# Patient Record
Sex: FEMALE | URBAN - METROPOLITAN AREA
[De-identification: names, ages, dates, MRNs, and addresses within clinical notes are randomized per-mention and may not be internally consistent; named-entity substitution may affect disease eponyms.]

---

## 2018-05-11 ENCOUNTER — OUTPATIENT (OUTPATIENT)
Dept: OUTPATIENT SERVICES | Facility: HOSPITAL | Age: 78
LOS: 1 days | End: 2018-05-11

## 2018-09-13 ENCOUNTER — TRANSCRIPTION ENCOUNTER (OUTPATIENT)
Age: 78
End: 2018-09-13

## 2021-03-29 ENCOUNTER — APPOINTMENT (OUTPATIENT)
Dept: CT IMAGING | Facility: CLINIC | Age: 81
End: 2021-03-29
Payer: MEDICARE

## 2021-03-29 PROCEDURE — Q9967B: CUSTOM

## 2021-03-29 PROCEDURE — 71275 CT ANGIOGRAPHY CHEST: CPT | Mod: MH

## 2021-03-29 PROCEDURE — 82565A: CUSTOM | Mod: QW

## 2021-07-26 PROBLEM — Z00.00 ENCOUNTER FOR PREVENTIVE HEALTH EXAMINATION: Status: ACTIVE | Noted: 2021-07-26

## 2021-08-05 ENCOUNTER — APPOINTMENT (OUTPATIENT)
Dept: NEUROSURGERY | Facility: CLINIC | Age: 81
End: 2021-08-05
Payer: MEDICARE

## 2021-08-05 VITALS
SYSTOLIC BLOOD PRESSURE: 145 MMHG | TEMPERATURE: 97.9 F | DIASTOLIC BLOOD PRESSURE: 90 MMHG | HEIGHT: 64.5 IN | BODY MASS INDEX: 22.43 KG/M2 | WEIGHT: 133 LBS | HEART RATE: 72 BPM

## 2021-08-05 DIAGNOSIS — M48.061 SPINAL STENOSIS, LUMBAR REGION WITHOUT NEUROGENIC CLAUDICATION: ICD-10-CM

## 2021-08-05 PROCEDURE — 99204 OFFICE O/P NEW MOD 45 MIN: CPT

## 2021-08-05 RX ORDER — LOSARTAN POTASSIUM 100 MG/1
TABLET, FILM COATED ORAL
Refills: 0 | Status: ACTIVE | COMMUNITY

## 2021-08-05 RX ORDER — HYDROCHLOROTHIAZIDE 12.5 MG/1
12.5 CAPSULE ORAL
Refills: 0 | Status: ACTIVE | COMMUNITY

## 2021-08-05 RX ORDER — DICYCLOMINE HYDROCHLORIDE 20 MG/1
20 TABLET ORAL
Refills: 0 | Status: ACTIVE | COMMUNITY

## 2021-08-05 RX ORDER — ALPRAZOLAM 0.25 MG/1
0.25 TABLET ORAL
Refills: 0 | Status: ACTIVE | COMMUNITY

## 2021-08-05 RX ORDER — SIMVASTATIN 80 MG/1
TABLET, FILM COATED ORAL
Refills: 0 | Status: ACTIVE | COMMUNITY

## 2021-08-05 RX ORDER — TRAZODONE HYDROCHLORIDE 300 MG/1
TABLET ORAL
Refills: 0 | Status: ACTIVE | COMMUNITY

## 2021-08-05 NOTE — REASON FOR VISIT
[New Patient Visit] : a new patient visit [Referred By: _________] : Patient was referred by SAROJ [FreeTextEntry1] : RT L4-5 Disc

## 2021-08-05 NOTE — PLAN
[FreeTextEntry1] : In summary this delightful 81-year-old female has chronic low back pain.  MRI significant for degenerative disc disease lumbar stenosis and Tarlov cysts.  This time the patient symptoms are quite manageable with conservative measures and she has not exhausted all options of conservative measures.  I would continue with therapies and if not improved to consider injections in the future.  We could see the patient back for follow-up in the future if all options of conservative measures fail patient understands and agrees with plan

## 2021-08-05 NOTE — HISTORY OF PRESENT ILLNESS
[FreeTextEntry1] : Low back pain [de-identified] : This is a 81-year-old female with right-sided low back pain which she has had for several years.  She has had no leg pain.  Denies any numbness tingling weakness or bladder bowel dysfunction.  She denies any balance issues.  She also has a secondary complaint of chronic neck pain and thoracic pain.  She states the pain on average daily is a 2 out of 10 on a visual analog scale.  The pain is intermediate.  She has tried steroid and anti-inflammatories without relief.  She has not had physical therapy but has been prescribed to start it.  She started some chiropractic care with some mild symptomatic relief.  She has not had pain management or injection therapy

## 2021-08-05 NOTE — RESULTS/DATA
[FreeTextEntry1] : Patient has an MRI done from [facility] which does show multilevel degenerative disc disease as well as stenosis at L3-S1.  She also has large Tarlov cyst seen over the sacrum

## 2022-05-25 ENCOUNTER — NON-APPOINTMENT (OUTPATIENT)
Age: 82
End: 2022-05-25

## 2022-11-19 ENCOUNTER — OFFICE (OUTPATIENT)
Dept: URBAN - METROPOLITAN AREA CLINIC 97 | Facility: CLINIC | Age: 82
Setting detail: OPHTHALMOLOGY
End: 2022-11-19
Payer: MEDICARE

## 2022-11-19 ENCOUNTER — RX ONLY (RX ONLY)
Age: 82
End: 2022-11-19

## 2022-11-19 DIAGNOSIS — H16.223: ICD-10-CM

## 2022-11-19 DIAGNOSIS — H35.3131: ICD-10-CM

## 2022-11-19 PROCEDURE — 92134 CPTRZ OPH DX IMG PST SGM RTA: CPT | Performed by: OPHTHALMOLOGY

## 2022-11-19 PROCEDURE — 92014 COMPRE OPH EXAM EST PT 1/>: CPT | Performed by: OPHTHALMOLOGY

## 2022-11-19 ASSESSMENT — REFRACTION_CURRENTRX
OD_SPHERE: -0.75
OS_AXIS: 180
OS_VPRISM_DIRECTION: PROGS
OD_AXIS: 015
OS_ADD: +2.75
OD_SPHERE: -0.75
OS_OVR_VA: 20/
OD_VPRISM_DIRECTION: PROGS
OS_CYLINDER: +0.25
OS_SPHERE: -1.25
OS_OVR_VA: 20/
OS_SPHERE: -2.00
OS_CYLINDER: +1.25
OD_OVR_VA: 20/
OD_CYLINDER: SPH
OD_OVR_VA: 20/
OD_AXIS: 015
OD_SPHERE: -0.75
OS_SPHERE: -1.50
OD_OVR_VA: 20/
OS_OVR_VA: 20/
OS_CYLINDER: +2.00
OD_CYLINDER: +1.75
OD_VPRISM_DIRECTION: PROGS
OD_CYLINDER: +2.25
OD_ADD: +2.75
OS_AXIS: 175
OD_ADD: +2.75
OS_AXIS: 050
OS_ADD: +2.75
OS_VPRISM_DIRECTION: PROGS

## 2022-11-19 ASSESSMENT — REFRACTION_MANIFEST
OS_SPHERE: -1.25
OS_VA2: 20/30(J2)
OU_VA: 20/25+2
OD_CYLINDER: +1.00
OS_ADD: +2.50
OD_SPHERE: +0.25
OS_SPHERE: -0.25
OD_VA2: 20/30(J2)
OD_CYLINDER: -1.00
OD_ADD: +2.50
OS_VA2: 20/25(J1)
OS_AXIS: 015
OD_VA2: 20/25(J1)
OS_VA1: 20/30+2
OS_CYLINDER: -1.00
OD_ADD: +3.00
OD_AXIS: 085
OS_CYLINDER: +1.00
OD_SPHERE: -1.00
OD_VA1: 20/40
OS_VA1: 20/40
OU_VA: 20/40
OS_AXIS: 105
OD_VA1: 20/25-2
OS_ADD: +3.00
OD_AXIS: 175

## 2022-11-19 ASSESSMENT — SPHEQUIV_DERIVED
OD_SPHEQUIV: -0.5
OS_SPHEQUIV: -1
OS_SPHEQUIV: -0.75
OD_SPHEQUIV: -0.25
OD_SPHEQUIV: -0.25
OS_SPHEQUIV: -0.75

## 2022-11-19 ASSESSMENT — REFRACTION_AUTOREFRACTION
OD_CYLINDER: -1.00
OD_SPHERE: +0.25
OS_CYLINDER: -1.00
OD_AXIS: 083
OS_SPHERE: -0.50
OS_AXIS: 105

## 2022-11-19 ASSESSMENT — DECREASING TEAR LAKE - SEVERITY SCORE
OD_DEC_TEARLAKE: 1+
OS_DEC_TEARLAKE: 1+

## 2022-11-19 ASSESSMENT — AXIALLENGTH_DERIVED
OS_AL: 23.701
OS_AL: 23.7997
OD_AL: 23.4605
OD_AL: 23.5572
OS_AL: 23.701
OD_AL: 23.4605

## 2022-11-19 ASSESSMENT — KERATOMETRY
OD_K1POWER_DIOPTERS: 43.75
OD_K2POWER_DIOPTERS: 44.50
OS_K1POWER_DIOPTERS: 43.50
OS_K2POWER_DIOPTERS: 44.50
METHOD_AUTO_MANUAL: AUTO
OD_AXISANGLE_DEGREES: 169
OS_AXISANGLE_DEGREES: 013

## 2022-11-19 ASSESSMENT — VISUAL ACUITY
OS_BCVA: 20/60-1
OD_BCVA: 20/50

## 2022-11-19 ASSESSMENT — TEAR BREAK UP TIME (TBUT)
OD_TBUT: 8 SEC
OS_TBUT: 8 SEC

## 2022-11-19 ASSESSMENT — CONFRONTATIONAL VISUAL FIELD TEST (CVF)
OS_FINDINGS: FULL
OD_FINDINGS: FULL

## 2022-11-19 ASSESSMENT — SUPERFICIAL PUNCTATE KERATITIS (SPK)
OD_SPK: T
OS_SPK: T

## 2023-06-21 ENCOUNTER — OFFICE (OUTPATIENT)
Dept: URBAN - METROPOLITAN AREA CLINIC 8 | Facility: CLINIC | Age: 83
Setting detail: OPHTHALMOLOGY
End: 2023-06-21
Payer: MEDICARE

## 2023-06-21 DIAGNOSIS — Z96.1: ICD-10-CM

## 2023-06-21 DIAGNOSIS — H35.3131: ICD-10-CM

## 2023-06-21 DIAGNOSIS — H16.223: ICD-10-CM

## 2023-06-21 PROCEDURE — 92134 CPTRZ OPH DX IMG PST SGM RTA: CPT | Performed by: OPHTHALMOLOGY

## 2023-06-21 PROCEDURE — 99213 OFFICE O/P EST LOW 20 MIN: CPT | Performed by: OPHTHALMOLOGY

## 2023-06-21 ASSESSMENT — REFRACTION_CURRENTRX
OD_CYLINDER: SPH
OS_CYLINDER: +0.25
OD_OVR_VA: 20/
OS_AXIS: 050
OD_OVR_VA: 20/
OD_AXIS: 088
OD_AXIS: 091
OS_AXIS: 106
OD_CYLINDER: -1.00
OS_VPRISM_DIRECTION: SV
OS_CYLINDER: -1.00
OS_SPHERE: +2.75
OS_CYLINDER: -1.00
OS_OVR_VA: 20/
OS_SPHERE: -1.50
OD_SPHERE: +3.00
OS_OVR_VA: 20/
OD_SPHERE: PLANO
OS_VPRISM_DIRECTION: SV
OD_VPRISM_DIRECTION: SV
OD_SPHERE: -0.75
OD_CYLINDER: -1.00
OS_SPHERE: -0.25
OD_OVR_VA: 20/
OS_OVR_VA: 20/
OS_AXIS: 102
OD_VPRISM_DIRECTION: SV

## 2023-06-21 ASSESSMENT — REFRACTION_MANIFEST
OD_ADD: +2.50
OS_CYLINDER: -1.00
OS_VA1: 20/40
OS_AXIS: 105
OS_ADD: +3.00
OD_AXIS: 175
OD_CYLINDER: +1.00
OS_VA2: 20/30(J2)
OD_ADD: +3.00
OS_ADD: +2.50
OS_CYLINDER: +1.00
OD_VA1: 20/25-2
OD_CYLINDER: -1.00
OU_VA: 20/25+2
OD_VA2: 20/30(J2)
OS_VA2: 20/25(J1)
OS_SPHERE: -1.25
OS_VA1: 20/30+2
OD_SPHERE: +0.25
OD_SPHERE: -1.00
OS_AXIS: 015
OS_SPHERE: -0.25
OD_VA1: 20/40
OU_VA: 20/40
OD_AXIS: 085
OD_VA2: 20/25(J1)

## 2023-06-21 ASSESSMENT — AXIALLENGTH_DERIVED
OS_AL: 23.7997
OS_AL: 23.701
OD_AL: 23.4605
OS_AL: 23.701
OD_AL: 23.4605
OD_AL: 23.5572

## 2023-06-21 ASSESSMENT — TEAR BREAK UP TIME (TBUT)
OS_TBUT: 6-8 SEC
OD_TBUT: 6-8 SEC

## 2023-06-21 ASSESSMENT — SUPERFICIAL PUNCTATE KERATITIS (SPK)
OS_SPK: T
OD_SPK: T

## 2023-06-21 ASSESSMENT — VISUAL ACUITY
OD_BCVA: 20/30-2
OS_BCVA: 20/30-

## 2023-06-21 ASSESSMENT — SPHEQUIV_DERIVED
OS_SPHEQUIV: -0.75
OD_SPHEQUIV: -0.25
OS_SPHEQUIV: -0.75
OS_SPHEQUIV: -1
OD_SPHEQUIV: -0.25
OD_SPHEQUIV: -0.5

## 2023-06-21 ASSESSMENT — KERATOMETRY
OS_K2POWER_DIOPTERS: 44.50
OD_K1POWER_DIOPTERS: 43.75
OS_K1POWER_DIOPTERS: 43.50
OD_AXISANGLE_DEGREES: 169
METHOD_AUTO_MANUAL: AUTO
OS_AXISANGLE_DEGREES: 013
OD_K2POWER_DIOPTERS: 44.50

## 2023-06-21 ASSESSMENT — DECREASING TEAR LAKE - SEVERITY SCORE
OD_DEC_TEARLAKE: 1+
OS_DEC_TEARLAKE: 1+

## 2023-06-21 ASSESSMENT — REFRACTION_AUTOREFRACTION
OS_AXIS: 105
OS_SPHERE: -0.50
OS_CYLINDER: -1.00
OD_CYLINDER: -1.00
OD_AXIS: 083
OD_SPHERE: +0.25

## 2023-06-21 ASSESSMENT — TONOMETRY
OD_IOP_MMHG: 13
OS_IOP_MMHG: 17

## 2023-06-21 ASSESSMENT — CONFRONTATIONAL VISUAL FIELD TEST (CVF)
OD_FINDINGS: FULL
OS_FINDINGS: FULL

## 2023-08-01 ENCOUNTER — APPOINTMENT (OUTPATIENT)
Dept: RADIOLOGY | Facility: CLINIC | Age: 83
End: 2023-08-01
Payer: MEDICARE

## 2023-08-01 PROCEDURE — 71046 X-RAY EXAM CHEST 2 VIEWS: CPT

## 2024-04-10 ENCOUNTER — OFFICE (OUTPATIENT)
Dept: URBAN - METROPOLITAN AREA CLINIC 8 | Facility: CLINIC | Age: 84
Setting detail: OPHTHALMOLOGY
End: 2024-04-10
Payer: MEDICARE

## 2024-04-10 DIAGNOSIS — H16.223: ICD-10-CM

## 2024-04-10 DIAGNOSIS — H52.4: ICD-10-CM

## 2024-04-10 DIAGNOSIS — Z96.1: ICD-10-CM

## 2024-04-10 DIAGNOSIS — H35.3131: ICD-10-CM

## 2024-04-10 PROCEDURE — 92015 DETERMINE REFRACTIVE STATE: CPT | Performed by: OPHTHALMOLOGY

## 2024-04-10 PROCEDURE — 92134 CPTRZ OPH DX IMG PST SGM RTA: CPT | Performed by: OPHTHALMOLOGY

## 2024-04-10 PROCEDURE — 92014 COMPRE OPH EXAM EST PT 1/>: CPT | Performed by: OPHTHALMOLOGY

## 2024-10-16 ENCOUNTER — OFFICE (OUTPATIENT)
Dept: URBAN - METROPOLITAN AREA CLINIC 8 | Facility: CLINIC | Age: 84
Setting detail: OPHTHALMOLOGY
End: 2024-10-16
Payer: MEDICARE

## 2024-10-16 DIAGNOSIS — Z96.1: ICD-10-CM

## 2024-10-16 DIAGNOSIS — H16.223: ICD-10-CM

## 2024-10-16 DIAGNOSIS — H35.3131: ICD-10-CM

## 2024-10-16 PROCEDURE — 92014 COMPRE OPH EXAM EST PT 1/>: CPT | Performed by: OPHTHALMOLOGY

## 2024-10-16 ASSESSMENT — REFRACTION_CURRENTRX
OS_OVR_VA: 20/
OS_CYLINDER: -1.00
OS_OVR_VA: 20/
OS_CYLINDER: -1.00
OD_OVR_VA: 20/
OS_CYLINDER: +0.25
OS_SPHERE: +2.75
OD_CYLINDER: -1.00
OD_AXIS: 088
OD_SPHERE: -0.75
OD_AXIS: 091
OD_CYLINDER: SPH
OD_VPRISM_DIRECTION: SV
OD_VPRISM_DIRECTION: SV
OD_OVR_VA: 20/
OS_AXIS: 106
OD_SPHERE: PLANO
OD_OVR_VA: 20/
OS_AXIS: 102
OS_SPHERE: -1.50
OS_OVR_VA: 20/
OS_VPRISM_DIRECTION: SV
OS_SPHERE: -0.25
OD_CYLINDER: -1.00
OD_SPHERE: +3.00
OS_AXIS: 050
OS_VPRISM_DIRECTION: SV

## 2024-10-16 ASSESSMENT — REFRACTION_MANIFEST
OD_CYLINDER: -1.00
OD_SPHERE: PLANO
OS_VA1: 20/40
OS_SPHERE: -0.75
OD_AXIS: 085
OS_CYLINDER: -1.00
OD_VA2: 20/25(J1)
OD_SPHERE: PLANO
OS_ADD: +3.00
OD_CYLINDER: -1.00
OS_VA2: 20/25(J1)
OS_ADD: +3.00
OS_CYLINDER: -1.00
OS_VA2: 20/25(J1)
OS_AXIS: 110
OD_AXIS: 085
OS_VA1: 20/30-
OS_AXIS: 105
OD_ADD: +3.00
OD_ADD: +3.00
OS_SPHERE: -0.75
OD_VA1: 20/30+
OD_VA2: 20/25(J1)
OD_VA1: 20/50
OU_VA: 20/30-2

## 2024-10-16 ASSESSMENT — KERATOMETRY
OD_K2POWER_DIOPTERS: 44.25
OS_K1POWER_DIOPTERS: 44.25
OS_K2POWER_DIOPTERS: 45.00
OS_AXISANGLE_DEGREES: 047
METHOD_AUTO_MANUAL: AUTO
OD_AXISANGLE_DEGREES: 172
OD_K1POWER_DIOPTERS: 43.25

## 2024-10-16 ASSESSMENT — DECREASING TEAR LAKE - SEVERITY SCORE
OD_DEC_TEARLAKE: 1+
OS_DEC_TEARLAKE: 1+

## 2024-10-16 ASSESSMENT — REFRACTION_AUTOREFRACTION
OS_AXIS: 112
OD_SPHERE: +0.25
OS_SPHERE: -0.50
OD_AXIS: 082
OS_CYLINDER: -1.50
OD_CYLINDER: -1.50

## 2024-10-16 ASSESSMENT — CONFRONTATIONAL VISUAL FIELD TEST (CVF)
OD_FINDINGS: FULL
OS_FINDINGS: FULL

## 2024-10-16 ASSESSMENT — TONOMETRY
OS_IOP_MMHG: 17
OD_IOP_MMHG: 14

## 2024-10-16 ASSESSMENT — VISUAL ACUITY
OD_BCVA: 20/40
OS_BCVA: 20/50-2

## 2024-10-16 ASSESSMENT — TEAR BREAK UP TIME (TBUT)
OD_TBUT: 6-8 SEC
OS_TBUT: 6-8 SEC

## 2024-10-16 ASSESSMENT — SUPERFICIAL PUNCTATE KERATITIS (SPK)
OS_SPK: T
OD_SPK: T

## 2025-07-16 ENCOUNTER — RX ONLY (RX ONLY)
Age: 85
End: 2025-07-16

## 2025-07-16 ENCOUNTER — OFFICE (OUTPATIENT)
Dept: URBAN - METROPOLITAN AREA CLINIC 8 | Facility: CLINIC | Age: 85
Setting detail: OPHTHALMOLOGY
End: 2025-07-16
Payer: MEDICARE

## 2025-07-16 DIAGNOSIS — H35.3131: ICD-10-CM

## 2025-07-16 DIAGNOSIS — Z96.1: ICD-10-CM

## 2025-07-16 DIAGNOSIS — H16.223: ICD-10-CM

## 2025-07-16 PROCEDURE — 92134 CPTRZ OPH DX IMG PST SGM RTA: CPT | Performed by: OPHTHALMOLOGY

## 2025-07-16 PROCEDURE — 99213 OFFICE O/P EST LOW 20 MIN: CPT | Performed by: OPHTHALMOLOGY

## 2025-07-16 ASSESSMENT — CONFRONTATIONAL VISUAL FIELD TEST (CVF)
OD_FINDINGS: FULL
OS_FINDINGS: FULL

## 2025-07-16 ASSESSMENT — DECREASING TEAR LAKE - SEVERITY SCORE
OS_DEC_TEARLAKE: 1+
OD_DEC_TEARLAKE: 1+

## 2025-07-16 ASSESSMENT — REFRACTION_CURRENTRX
OD_VPRISM_DIRECTION: SV
OD_CYLINDER: -1.00
OD_OVR_VA: 20/
OD_SPHERE: +3.00
OS_SPHERE: -1.50
OD_VPRISM_DIRECTION: SV
OS_AXIS: 102
OS_CYLINDER: +0.25
OD_OVR_VA: 20/
OS_OVR_VA: 20/
OS_SPHERE: +2.75
OS_SPHERE: -0.25
OD_AXIS: 088
OS_VPRISM_DIRECTION: SV
OD_SPHERE: PLANO
OD_CYLINDER: SPH
OS_VPRISM_DIRECTION: SV
OS_CYLINDER: -1.00
OS_OVR_VA: 20/
OD_SPHERE: -0.75
OS_CYLINDER: -1.00
OD_AXIS: 091
OS_AXIS: 106
OD_OVR_VA: 20/
OS_OVR_VA: 20/
OD_CYLINDER: -1.00
OS_AXIS: 050

## 2025-07-16 ASSESSMENT — REFRACTION_MANIFEST
OS_VA2: 20/25(J1)
OD_SPHERE: PLANO
OU_VA: 20/25-2
OS_VA2: 20/25(J1)
OD_SPHERE: PLANO
OS_ADD: +3.00
OS_AXIS: 110
OD_ADD: +3.00
OD_VA2: 20/25(J1)
OD_VA1: 20/30-2
OS_AXIS: 110
OD_ADD: +3.00
OD_CYLINDER: -1.50
OD_VA1: 20/30-2
OD_AXIS: 075
OS_VA1: 20/25-2
OS_CYLINDER: -1.50
OD_CYLINDER: -1.50
OS_VA1: 20/25-2
OU_VA: 20/25-2
OD_AXIS: 075
OS_CYLINDER: -1.50
OD_VA2: 20/25(J1)
OS_ADD: +3.00
OS_SPHERE: -0.50
OS_SPHERE: -0.50

## 2025-07-16 ASSESSMENT — SUPERFICIAL PUNCTATE KERATITIS (SPK)
OD_SPK: T
OS_SPK: T

## 2025-07-16 ASSESSMENT — KERATOMETRY
METHOD_AUTO_MANUAL: AUTO
OS_K2POWER_DIOPTERS: 45.25
OD_K2POWER_DIOPTERS: 44.75
OS_AXISANGLE_DEGREES: 010
OS_K1POWER_DIOPTERS: 44.00
OD_AXISANGLE_DEGREES: 169
OD_K1POWER_DIOPTERS: 43.25

## 2025-07-16 ASSESSMENT — REFRACTION_AUTOREFRACTION
OD_CYLINDER: -1.50
OD_AXIS: 074
OS_SPHERE: -0.50
OS_AXIS: 110
OD_SPHERE: +0.25
OS_CYLINDER: -1.50

## 2025-07-16 ASSESSMENT — TEAR BREAK UP TIME (TBUT)
OD_TBUT: 6-8 SEC
OS_TBUT: 6-8 SEC

## 2025-07-16 ASSESSMENT — VISUAL ACUITY
OD_BCVA: 20/40
OS_BCVA: 20/50-2

## 2025-08-17 ENCOUNTER — NON-APPOINTMENT (OUTPATIENT)
Age: 85
End: 2025-08-17

## 2025-08-29 ENCOUNTER — NON-APPOINTMENT (OUTPATIENT)
Age: 85
End: 2025-08-29